# Patient Record
Sex: MALE | Race: WHITE | NOT HISPANIC OR LATINO | Employment: OTHER | URBAN - NONMETROPOLITAN AREA
[De-identification: names, ages, dates, MRNs, and addresses within clinical notes are randomized per-mention and may not be internally consistent; named-entity substitution may affect disease eponyms.]

---

## 2017-01-03 LAB
ALBUMIN SERPL-MCNC: 3.8 G/DL (ref 3.5–5)
ALBUMIN/GLOB SERPL: 1.3 {RATIO} (ref 1–2)
ALP SERPL-CCNC: 124 U/L (ref 38–126)
ALT SERPL-CCNC: 67 U/L (ref 13–69)
ANION GAP SERPL CALC-SCNC: 19 MMOL/L (ref 10–20)
ANISOCYTOSIS BLD QL: ABNORMAL
AST SERPL-CCNC: 34 U/L (ref 15–46)
BILIRUB SERPL-MCNC: 0.4 MG/DL (ref 0.2–1.3)
BUN SERPL-MCNC: 13 MG/DL (ref 7–20)
BUN/CREAT SERPL: 16.4 (ref 6.3–21.9)
CALCIUM SERPL-MCNC: 9.3 MG/DL (ref 8.4–10.2)
CHLORIDE SERPL-SCNC: 107 MMOL/L (ref 98–107)
CONV CO2: 27 MMOL/L (ref 26–30)
CONV MICROCYTES IN BLOOD BY LIGHT MICROSCOPY: ABNORMAL
CONV POIKILOCYTOSIS IN BLOOD BY LIGHT MICROSCOPY: SLIGHT
CONV POLYCHROMASIA IN BLOOD BY LIGHT MICROSCOPY: ABNORMAL
CONV TOTAL COUNTED: 100
CONV TOTAL PROTEIN: 6.7 G/DL (ref 6.3–8.2)
CREAT UR-MCNC: 0.8 MG/DL (ref 0.6–1.3)
ERYTHROCYTE [DISTWIDTH] IN BLOOD BY AUTOMATED COUNT: 17.2 % (ref 11.5–14.5)
GFR SERPL CREATININE-BSD FRML MDRD: 100 ML/MIN
GLUCOSE SERPL-MCNC: 143 MG/DL (ref 74–98)
HCT VFR BLD AUTO: 28 % (ref 42–52)
HGB BLD-MCNC: 9.4 G/DL (ref 14–18)
LYMPHOCYTES NFR BLD MANUAL: 8 % (ref 10–50)
MAGNESIUM SERPL-MCNC: 1.6 MG/DL (ref 1.6–2.3)
MCH RBC QN AUTO: 30.2 UUG (ref 27–31)
MCHC RBC AUTO-ENTMCNC: 33.3 G/DL (ref 30–37)
MCV RBC AUTO: 90.7 FL (ref 80–94)
METAMYELOCYTES NFR BLD MANUAL: 5 %
MONOCYTES NFR BLD MANUAL: 25 % (ref 0–12)
MYELOCYTES NFR BLD MANUAL: 1 %
NEUTROPHILS NFR BLD MANUAL: 46 % (ref 37–80)
NEUTS BAND NFR BLD MANUAL: 15 % (ref 0–6)
NRBC BLD AUTO-RTO: 1 %
PHOSPHATE SERPL-MCNC: 4.4 MG/DL (ref 2.5–4.5)
PLATELET # BLD AUTO: 203 THOUS (ref 130–400)
POTASSIUM SERPL-SCNC: 3.6 MMOL/L (ref 3.5–5.1)
RBC # BLD AUTO: 3.11 M/UL (ref 4.7–6.1)
SMALL PLATELETS BLD QL SMEAR: ADEQUATE
SODIUM SERPL-SCNC: 149 MMOL/L (ref 137–145)
WBC # BLD AUTO: 9.4 THOUS (ref 4.8–10.8)

## 2017-01-06 ENCOUNTER — HOSPITAL ENCOUNTER (OUTPATIENT)
Dept: INFUSION THERAPY | Facility: HOSPITAL | Age: 57
Discharge: HOME | End: 2017-01-06
Attending: INTERNAL MEDICINE

## 2017-01-06 LAB
ANISOCYTOSIS BLD QL: ABNORMAL
CONV MICROCYTES IN BLOOD BY LIGHT MICROSCOPY: SLIGHT
CONV POIKILOCYTOSIS IN BLOOD BY LIGHT MICROSCOPY: SLIGHT
CONV POLYCHROMASIA IN BLOOD BY LIGHT MICROSCOPY: ABNORMAL
CONV TOTAL COUNTED: 100
ERYTHROCYTE [DISTWIDTH] IN BLOOD BY AUTOMATED COUNT: 21.1 % (ref 11.5–14.5)
HCT VFR BLD AUTO: 29 % (ref 42–52)
HGB BLD-MCNC: 9.4 G/DL (ref 14–18)
LYMPHOCYTES NFR BLD MANUAL: 8 % (ref 10–50)
MCH RBC QN AUTO: 30.5 UUG (ref 27–31)
MCHC RBC AUTO-ENTMCNC: 32.5 G/DL (ref 30–37)
MCV RBC AUTO: 93.8 FL (ref 80–94)
MONOCYTES NFR BLD MANUAL: 24 % (ref 0–12)
NEUTROPHILS NFR BLD MANUAL: 65 % (ref 37–80)
NEUTS BAND NFR BLD MANUAL: 3 % (ref 0–6)
PLATELET # BLD AUTO: 209 THOUS (ref 130–400)
RBC # BLD AUTO: 3.08 M/UL (ref 4.7–6.1)
SMALL PLATELETS BLD QL SMEAR: ADEQUATE
WBC # BLD AUTO: 7.3 THOUS (ref 4.8–10.8)

## 2017-02-20 ENCOUNTER — TRANSCRIBE ORDERS (OUTPATIENT)
Dept: MRI IMAGING | Facility: HOSPITAL | Age: 57
End: 2017-02-20

## 2017-02-20 DIAGNOSIS — C91.12: Primary | ICD-10-CM

## 2017-02-22 ENCOUNTER — HOSPITAL ENCOUNTER (OUTPATIENT)
Dept: CT IMAGING | Facility: HOSPITAL | Age: 57
Discharge: HOME | End: 2017-02-22

## 2017-02-22 ENCOUNTER — HOSPITAL ENCOUNTER (OUTPATIENT)
Dept: MRI IMAGING | Facility: HOSPITAL | Age: 57
Discharge: HOME | End: 2017-02-22
Admitting: INTERNAL MEDICINE

## 2017-02-22 ENCOUNTER — HOSPITAL ENCOUNTER (OUTPATIENT)
Dept: MRI IMAGING | Facility: HOSPITAL | Age: 57
Discharge: HOME | End: 2017-02-22

## 2017-02-22 DIAGNOSIS — C91.12: ICD-10-CM

## 2017-02-22 PROCEDURE — 70450 CT HEAD/BRAIN W/O DYE: CPT

## 2017-02-22 PROCEDURE — 0 GADOBENATE DIMEGLUMINE 529 MG/ML SOLUTION: Performed by: INTERNAL MEDICINE

## 2017-02-22 PROCEDURE — A9577 INJ MULTIHANCE: HCPCS | Performed by: INTERNAL MEDICINE

## 2017-02-22 PROCEDURE — 72157 MRI CHEST SPINE W/O & W/DYE: CPT

## 2017-02-22 PROCEDURE — 72158 MRI LUMBAR SPINE W/O & W/DYE: CPT

## 2017-02-22 PROCEDURE — 72156 MRI NECK SPINE W/O & W/DYE: CPT

## 2017-02-22 RX ADMIN — GADOBENATE DIMEGLUMINE 15 ML: 529 INJECTION, SOLUTION INTRAVENOUS at 14:30

## 2017-02-22 RX ADMIN — GADOBENATE DIMEGLUMINE 3 ML: 529 INJECTION, SOLUTION INTRAVENOUS at 14:30

## 2017-03-03 ENCOUNTER — OFFICE VISIT (OUTPATIENT)
Dept: INTERNAL MEDICINE | Facility: CLINIC | Age: 57
End: 2017-03-03

## 2017-03-03 VITALS
OXYGEN SATURATION: 97 % | HEIGHT: 72 IN | WEIGHT: 211.19 LBS | TEMPERATURE: 98.2 F | BODY MASS INDEX: 28.6 KG/M2 | HEART RATE: 78 BPM | RESPIRATION RATE: 16 BRPM | DIASTOLIC BLOOD PRESSURE: 94 MMHG | SYSTOLIC BLOOD PRESSURE: 158 MMHG

## 2017-03-03 DIAGNOSIS — H04.123 DRY EYES, BILATERAL: ICD-10-CM

## 2017-03-03 DIAGNOSIS — I10 ESSENTIAL HYPERTENSION: Primary | ICD-10-CM

## 2017-03-03 PROBLEM — K59.03 DRUG-INDUCED CONSTIPATION: Status: ACTIVE | Noted: 2017-03-03

## 2017-03-03 PROBLEM — F41.9 ANXIETY: Status: ACTIVE | Noted: 2017-03-03

## 2017-03-03 PROBLEM — M1A.0720 CHRONIC IDIOPATHIC GOUT OF LEFT FOOT: Status: ACTIVE | Noted: 2017-03-03

## 2017-03-03 PROBLEM — F33.1 MODERATE EPISODE OF RECURRENT MAJOR DEPRESSIVE DISORDER (HCC): Chronic | Status: ACTIVE | Noted: 2017-03-03

## 2017-03-03 PROBLEM — Z87.898 H/O BRAIN TUMOR: Status: ACTIVE | Noted: 2017-03-03

## 2017-03-03 PROBLEM — C95.10 CHRONIC LEUKEMIA (HCC): Status: ACTIVE | Noted: 2017-03-03

## 2017-03-03 PROBLEM — H49.01 WEAKNESS OF RIGHT THIRD CRANIAL NERVE: Status: ACTIVE | Noted: 2017-03-03

## 2017-03-03 PROBLEM — Z98.890: Status: ACTIVE | Noted: 2017-03-03

## 2017-03-03 PROCEDURE — 99204 OFFICE O/P NEW MOD 45 MIN: CPT | Performed by: NURSE PRACTITIONER

## 2017-03-03 RX ORDER — AMLODIPINE BESYLATE 5 MG/1
10 TABLET ORAL DAILY
Qty: 60 TABLET | Refills: 0 | Status: SHIPPED | OUTPATIENT
Start: 2017-03-03 | End: 2017-04-02

## 2017-03-03 RX ORDER — ESCITALOPRAM OXALATE 5 MG/1
5 TABLET ORAL DAILY
COMMUNITY
End: 2017-03-20 | Stop reason: SDUPTHER

## 2017-03-03 RX ORDER — POLYETHYLENE GLYCOL 3350 17 G/17G
17 POWDER, FOR SOLUTION ORAL DAILY
Refills: 0 | COMMUNITY
Start: 2017-02-20

## 2017-03-03 RX ORDER — LORAZEPAM 0.5 MG/1
0.5 TABLET ORAL DAILY PRN
Refills: 0 | COMMUNITY
Start: 2017-02-20

## 2017-03-03 RX ORDER — HYDROCODONE BITARTRATE AND ACETAMINOPHEN 5; 325 MG/1; MG/1
1 TABLET ORAL EVERY 6 HOURS PRN
Refills: 0 | COMMUNITY
Start: 2017-02-20

## 2017-03-03 RX ORDER — AMLODIPINE BESYLATE 5 MG/1
5 TABLET ORAL DAILY
COMMUNITY
Start: 2017-02-28 | End: 2017-03-03 | Stop reason: SDUPTHER

## 2017-03-03 NOTE — TELEPHONE ENCOUNTER
Had a voicemail from [place]ing  to verify amlodipine dosage. Insurance would not cover 5 mg  2 qd so it was changed to 10 mg 1 qd.

## 2017-03-03 NOTE — PROGRESS NOTES
Chief Complaint / Reason:      Chief Complaint   Patient presents with   • Establish Care     hypertension       Subjective   Patient presents today to establish care and discuss a few of his concerns. He states that he previously had a PCP but felt as though they neglected to provide adequate care. He states that he has been on BP medications for a while but he has been having daily headaches. In addition to the HTN he is S/P brain surgery (2 weeks ago) where they were unable to remove the full mass because of the location and he will have radiation treatments. He goes to [facility] in [facility]. 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   Hypertension   This is a chronic problem. The current episode started more than 1 year ago. The problem has been waxing and waning since onset. The problem is uncontrolled. Associated symptoms include headaches. There are no associated agents to hypertension. Risk factors for coronary artery disease include family history, male gender and stress. Past treatments include calcium channel blockers. The current treatment provides mild improvement. There are no compliance problems.  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 He is scheduled to radiation treatments to shrink tumor.     History taken from: patient    PMH/FH/Social History were reviewed and updated appropriately in the electronic medical record.     Review of Systems:   Review of Systems   Constitutional: Negative.    Eyes: Positive for pain, itching and visual disturbance.   Respiratory: Negative.    Cardiovascular: Negative.    Genitourinary: Negative.    Musculoskeletal: Negative.    Skin: Negative.    Neurological: Positive for headaches.   Psychiatric/Behavioral: Negative.      All other systems were reviewed and are negative.  Exceptions are noted in the subjective or above.      Objective     Vital  Signs     Body mass index is 28.64 kg/(m^2).    Physical Exam   Constitutional: He is oriented to person, place, and time. He appears well-developed and well-nourished.   HENT:   Head: Normocephalic.       Right Ear: Tympanic membrane normal.   Left Ear: Tympanic membrane normal.   Nose: Nose normal. Right sinus exhibits no maxillary sinus tenderness and no frontal sinus tenderness. Left sinus exhibits no maxillary sinus tenderness and no frontal sinus tenderness.   Mouth/Throat: Mucous membranes are dry.   Eyes: Right eye exhibits no discharge. Left eye exhibits no discharge.       Neck: Normal range of motion.   Cardiovascular: Normal rate, regular rhythm, normal heart sounds and intact distal pulses.    Pulmonary/Chest: Effort normal and breath sounds normal. He has no wheezes.   Abdominal: Soft. Bowel sounds are normal. He exhibits no distension. There is no tenderness.   Musculoskeletal: Normal range of motion.   Lymphadenopathy:     He has no cervical adenopathy.   Neurological: He is alert and oriented to person, place, and time.   Skin: Skin is warm and dry.   Psychiatric: He has a normal mood and affect. His behavior is normal. Judgment and thought content normal.   Vitals reviewed.      Medication Review:     Current Outpatient Prescriptions:   •  escitalopram (LEXAPRO) 5 MG tablet, Take 5 mg by mouth Daily., Disp: , Rfl:   •  amLODIPine (NORVASC) 5 MG tablet, Take 2 tablets by mouth Daily for 30 days., Disp: 60 tablet, Rfl: 0  •  HYDROcodone-acetaminophen (NORCO) 5-325 MG per tablet, 1 tablet Every 6 (Six) Hours As Needed., Disp: , Rfl: 0  •  LORazepam (ATIVAN) 0.5 MG tablet, Take 0.5 mg by mouth Daily As Needed., Disp: , Rfl: 0  •  polyethylene glycol (MIRALAX) powder, Take 17 g by mouth Daily., Disp: , Rfl: 0    Assessment/Plan   Casey was seen today for establish care.    Diagnoses and all orders for this visit:    Essential hypertension  -     amLODIPine (NORVASC) 5 MG tablet; Take 2 tablets by mouth  Daily for 30 days. (increased Norvasc to 10 mg Daily)  Dry eyes, bilateral  Recommend OTC eye drops and lubricant for discomfort.   Return in 1 week for blood pressure check. Will not do labs at this time, need to obtain consent for release of information to get results from other providers.   Follow up as needed.       Quyen Oreilly, APRN  03/03/17    *. Please note that portions of this note were completed with a voice recognition program. Efforts were made to edit the dictations, but occasionally words are mistranscribed.

## 2017-03-06 PROBLEM — M43.10 SPONDYLOLISTHESIS: Status: ACTIVE | Noted: 2017-03-06

## 2017-03-10 ENCOUNTER — OFFICE VISIT (OUTPATIENT)
Dept: INTERNAL MEDICINE | Facility: CLINIC | Age: 57
End: 2017-03-10

## 2017-03-10 VITALS
DIASTOLIC BLOOD PRESSURE: 80 MMHG | RESPIRATION RATE: 16 BRPM | SYSTOLIC BLOOD PRESSURE: 140 MMHG | TEMPERATURE: 98.3 F | HEART RATE: 88 BPM | BODY MASS INDEX: 28.33 KG/M2 | HEIGHT: 72 IN | WEIGHT: 209.19 LBS | OXYGEN SATURATION: 96 %

## 2017-03-10 DIAGNOSIS — I10 ESSENTIAL HYPERTENSION: Primary | ICD-10-CM

## 2017-03-10 PROCEDURE — 99213 OFFICE O/P EST LOW 20 MIN: CPT | Performed by: NURSE PRACTITIONER

## 2017-03-10 NOTE — PROGRESS NOTES
"Subjective:      Casey Bernardo is here for follow-up of his hypertension. His high blood pressure was noted at last visit here to be elevated with complaints of headache and not feeling well. Home blood pressure readings: were not recorded and did not bring log. Salt intake and diet: diet: low sodium and caffeine intake dicussed.  Associated signs and symptoms: previously headache and tiredness/fatigue. Patient denies: blurred vision, chest pain, dyspnea, neck aches, orthopnea, palpitations, peripheral edema and pulsating in the ears. Use of agents associated with hypertension: patient was taking Viagra but not recommended while BP is not controlled. Medication compliance: he reports taking as prescribed.  Patient adjustments were made previous visit.  Patient reports that blood pressure has been doing good and he feels better.  He denies chest pain or shortness of breath.    The following portions of the patient's history were reviewed and updated as appropriate: allergies, current medications, past family history, past medical history, past social history, past surgical history and problem list.     Review of Systems  Constitutional: negative  Respiratory: negative  Cardiovascular:  negative.      Objective:      Physical Exam:  Visit Vitals   • /80 (BP Location: Left arm)   • Pulse 88   • Temp 98.3 °F (36.8 °C)   • Resp 16   • Ht 72\" (182.9 cm)   • Wt 209 lb 3 oz (94.9 kg)   • SpO2 96%   • BMI 28.37 kg/m2       General Appearance:    Alert, cooperative, no distress, appears stated age   Head:    Normocephalic, without obvious abnormality, atraumatic   Eyes:    Eye patch covering Right eye    Ears:    Normal TM's and external ear canals, both ears   Nose:   Nares normal, septum midline, mucosa normal, no drainage    or sinus tenderness   Throat:   Lips, mucosa, and tongue normal; teeth and gums normal   Neck:   Supple, symmetrical, trachea midline, no adenopathy;        thyroid:  No " enlargement/tenderness/nodules; no carotid    bruit or JVD   Back:     Symmetric, no curvature, ROM normal, no CVA tenderness   Lungs:     Clear to auscultation bilaterally, respirations unlabored   Chest wall:    No tenderness or deformity   Heart:    Regular rate and rhythm, S1 and S2 normal, no murmur, rub   or gallop   Abdomen:     Soft, non-tender, bowel sounds active all four quadrants,     no masses, no organomegaly   Genitalia:    Normal male without lesion, discharge or tenderness   Rectal:    Normal tone, normal prostate, no masses or tenderness;    guaiac negative stool   Extremities:   Extremities normal, atraumatic, no cyanosis or edema   Pulses:   2+ and symmetric all extremities   Skin:   Skin color, texture, turgor normal, no rashes or lesions   Lymph nodes:   Cervical, supraclavicular, and axillary nodes normal   Neurologic:   Normal strength, sensation and reflexes intact       Lab Review   not applicable at this time      Assessment:      Hypertension, stage 1. Evidence of target organ damage: none .      Plan:   Patient is scheduled to begin radiation treatments on Monday and would like to see patient back following radiation treatments to discuss further blood pressure parameters. No changes or adjustments were made today to medication regimen.   Check blood pressures 2 times daily and record.  Follow up: 4 week and as needed.    Quyen Oreilly, APRN  03/10/2017

## 2017-03-20 RX ORDER — ESCITALOPRAM OXALATE 5 MG/1
5 TABLET ORAL DAILY
Qty: 30 TABLET | Refills: 5 | Status: SHIPPED | OUTPATIENT
Start: 2017-03-20